# Patient Record
Sex: MALE | Race: WHITE | Employment: UNEMPLOYED | URBAN - METROPOLITAN AREA
[De-identification: names, ages, dates, MRNs, and addresses within clinical notes are randomized per-mention and may not be internally consistent; named-entity substitution may affect disease eponyms.]

---
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
## 2021-10-27 ENCOUNTER — HOSPITAL ENCOUNTER (EMERGENCY)
Age: 21
Discharge: HOME | End: 2021-10-28
Payer: COMMERCIAL

## 2021-10-27 ENCOUNTER — APPOINTMENT (OUTPATIENT)
Dept: GENERAL RADIOLOGY | Age: 21
End: 2021-10-27
Payer: COMMERCIAL

## 2021-10-27 VITALS
OXYGEN SATURATION: 98 % | DIASTOLIC BLOOD PRESSURE: 108 MMHG | SYSTOLIC BLOOD PRESSURE: 162 MMHG | RESPIRATION RATE: 18 BRPM | HEART RATE: 108 BPM | TEMPERATURE: 98.8 F

## 2021-10-27 DIAGNOSIS — M25.562 ACUTE PAIN OF LEFT KNEE: Primary | ICD-10-CM

## 2021-10-27 PROCEDURE — 73562 X-RAY EXAM OF KNEE 3: CPT

## 2021-10-27 PROCEDURE — 99282 EMERGENCY DEPT VISIT SF MDM: CPT

## 2021-10-27 ASSESSMENT — PAIN SCALES - GENERAL: PAINLEVEL_OUTOF10: 10

## 2021-10-27 ASSESSMENT — PAIN DESCRIPTION - PAIN TYPE: TYPE: ACUTE PAIN

## 2021-10-27 ASSESSMENT — PAIN DESCRIPTION - ORIENTATION: ORIENTATION: LEFT

## 2021-10-27 ASSESSMENT — PAIN DESCRIPTION - LOCATION: LOCATION: KNEE

## 2021-10-27 NOTE — Clinical Note
Chepe Lord was seen and treated in our emergency department on 10/27/2021. He may return to work on 10/30/2021. Follow-up with your primary care provider in 1 to 2 days return to the emergency room immediately if any symptoms worsen. Follow-up with orthopedics with number provided. If you have any questions or concerns, please don't hesitate to call.       Ru Chase PA-C

## 2021-10-28 NOTE — ED PROVIDER NOTES
Seen independently       [facility]  Department of Emergency Medicine   ED  Encounter Note  Admit Date/RoomTime: 10/27/2021 11:41 PM  ED Room: [facility]    NAME: Shana Huber  : 2000  MRN: [id number]     Chief Complaint:  Leg Pain (pt hurt left knee today while at work, unsure exactly how)    HISTORY OF PRESENT ILLNESS        Shana Huber is a 24 y.o. male who presents to the ED by private vehicle for left knee pain. Patient states that he was at work yesterday when he was walking down trailer and hyperextended his left knee. Patient states that he misstepped and it caused his knee to go backwards. Patient states that he has been able to walk on it since but it has caused him some pain. Patient denies any previous surgeries or fractures of the left knee. Patient denies any loss of sensation distally to the injury. Patient has not take anything at home for symptoms. Patient denies using any heat or ice to help with symptoms. Patient denies any other injuries from the incident. Patient denies any significant past medical history denies any medication allergies. ROS   Pertinent positives and negatives are stated within HPI, all other systems reviewed and are negative. Past Medical History:  has no past medical history on file. Surgical History:  has no past surgical history on file. Social History:  reports that he has never smoked. He has never used smokeless tobacco. He reports previous drug use. He reports that he does not drink alcohol. Family History: family history is not on file. Allergies: Patient has no known allergies. PHYSICAL EXAM   Oxygen Saturation Interpretation: Normal on room air analysis. ED Triage Vitals [10/27/21 2300]   BP Temp Temp src Pulse Resp SpO2 Height Weight   -- -- -- 112 -- 96 % -- --         Physical Exam  Constitutional/General: Alert and oriented x3, well appearing, non toxic. HEENT:  NC/NT. PERRLA,  Airway patent.   Neck: Supple, full ROM  Respiratory: Lungs clear to auscultation bilaterally  CV:  Regular rate. Regular rhythm. GI:  Abdomen Soft, Non tender, Non distended. +BS. Musculoskeletal: Moves all extremities x 4. Warm and well perfused, no clubbing, cyanosis, or edema. Patient is able to bear weight on the left knee. Patient has some tenderness across the anterior left knee joint. There is no obvious swelling. Patient is neurovascularly intact distal to the left knee injury. Patient seen he appears to be stable and does not have any laxity on examination. Integument: skin warm and dry. No rashes. Neurologic: GCS 15, no focal deficits. Intact sensation. Psychiatric: Normal Affect      Lab / Imaging Results   (All laboratory and radiology results have been personally reviewed by myself)  Labs:  No results found for this visit on 10/27/21. Imaging: All Radiology results interpreted by Radiologist unless otherwise noted. XR KNEE LEFT (3 VIEWS)   Final Result   Normal radiographic appearance of the left knee. ED Course / Medical Decision Making   Medications - No data to display      Consultations:             None    Procedures:   none    MDM:    00:55  Updated the patient on the results of his x-ray. His x-ray shows no acute abnormalities. Patient will be discharged at this time. He was instructed to follow-up with his primary care provider in 1 to 2 days return to the emergency room with any worsening symptoms. He was instructed to take Tylenol ibuprofen at home for his pain. Patient and mother in agreement this plan of care. Plan of Care/Counseling:  Jaydon Hale PA-C reviewed today's visit with the patient in addition to providing specific details for the plan of care and counseling regarding the diagnosis and prognosis. Questions are answered at this time and are agreeable with the plan. ASSESSMENT     1.  Acute pain of left knee      This patient's ED course included: a personal history and physicial examination  This patient has remained hemodynamically stable during their ED course. PLAN   Discharged home. Patient condition is stable. New Medications     New Prescriptions    No medications on file     Electronically signed by Parth Fitzgerald   DD: 10/27/21  **This report was transcribed using voice recognition software. Every effort was made to ensure accuracy; however, inadvertent computerized transcription errors may be present.   END OF PROVIDER NOTE     Jaydon Hale PA-C  10/28/21 2197

## 2021-11-01 ENCOUNTER — TELEPHONE (OUTPATIENT)
Dept: ADMINISTRATIVE | Age: 21
End: 2021-11-01

## 2021-11-01 DIAGNOSIS — M25.562 ACUTE PAIN OF LEFT KNEE: Primary | ICD-10-CM

## 2021-11-01 NOTE — TELEPHONE ENCOUNTER
If work related injury recommend follow with corporate care.   [facility]  [address] [place] [address]  P: [phone]  Hours of Operation  Monday - Friday 8:00 AM - 4:30 PM    If patient not pursuing [employer] work injury will place referral to sports medicine for knee pain  Electronically signed by Bobby Quesada PA-C on 11/1/2021 at 2:59 PM

## 2021-11-01 NOTE — TELEPHONE ENCOUNTER
Pt was seen in the [facility] ED on 10/27 for Acute pain of left knee,  And called to schedule his follow up with Dr Lio Gutierrez.

## 2021-11-01 NOTE — TELEPHONE ENCOUNTER
Chief Complaint:  Leg Pain (pt hurt left knee today while at work, unsure exactly how)      XR KNEE LEFT (3 VIEWS)   Final Result   Normal radiographic appearance of the left knee. Routed to Providers for consideration and guidance.
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
## 2021-11-02 NOTE — TELEPHONE ENCOUNTER
Order pended and routed for decision and signature for referral to sports medicine per Oneida Moody PA-C.

## 2021-11-03 NOTE — TELEPHONE ENCOUNTER
Call placed to pt's father, notified of no soon availability and referral placed to sports med. He verbalized understanding. Questions answered, provided phone number to Dr. Tami López office.

## 2021-11-05 ENCOUNTER — OFFICE VISIT (OUTPATIENT)
Dept: SPORTS MEDICINE | Age: 21
End: 2021-11-05
Payer: COMMERCIAL

## 2021-11-05 VITALS — HEIGHT: 65 IN | WEIGHT: 250 LBS | BODY MASS INDEX: 41.65 KG/M2

## 2021-11-05 DIAGNOSIS — S83.242A TEAR OF MEDIAL MENISCUS OF LEFT KNEE, CURRENT, UNSPECIFIED TEAR TYPE, INITIAL ENCOUNTER: ICD-10-CM

## 2021-11-05 DIAGNOSIS — M25.562 ACUTE PAIN OF LEFT KNEE: Primary | ICD-10-CM

## 2021-11-05 PROCEDURE — 1036F TOBACCO NON-USER: CPT | Performed by: FAMILY MEDICINE

## 2021-11-05 PROCEDURE — G8484 FLU IMMUNIZE NO ADMIN: HCPCS | Performed by: FAMILY MEDICINE

## 2021-11-05 PROCEDURE — G8417 CALC BMI ABV UP PARAM F/U: HCPCS | Performed by: FAMILY MEDICINE

## 2021-11-05 PROCEDURE — 99203 OFFICE O/P NEW LOW 30 MIN: CPT | Performed by: FAMILY MEDICINE

## 2021-11-05 PROCEDURE — G8427 DOCREV CUR MEDS BY ELIG CLIN: HCPCS | Performed by: FAMILY MEDICINE

## 2021-11-05 RX ORDER — DICLOFENAC SODIUM 75 MG/1
75 TABLET, DELAYED RELEASE ORAL 2 TIMES DAILY WITH MEALS
Qty: 60 TABLET | Refills: 0 | Status: SHIPPED
Start: 2021-11-05 | End: 2022-07-31

## 2021-11-05 RX ORDER — CETIRIZINE HYDROCHLORIDE 10 MG/1
10 TABLET ORAL DAILY
COMMUNITY

## 2021-11-05 NOTE — PROGRESS NOTES
[facility]  PRIMARY CARE SPORTS MEDICINE  DATE OF VISIT : 2021    Patient : Joann Hubbard  Age : 24 y.o.  : 2000  MRN : [id number]   ______________________________________________________________________    Chief Complaint :   Chief Complaint   Patient presents with    Knee Pain     (L) knee pain. Pain has been ongoing since 10/27. Pain is located at joint line of (L) knee. Patient presents in office today with swelling of the knee. JUAN JOSE Patient slipped on wet grass 10/27/2021. HPI : Joann Hubbard is 24 y.o. male who presented to the clinic today for evaluation of left knee pain. Onset of the symptoms was 10/27/2021 for a fall with a twisting mechanism. Current symptoms include giving out, pain located medially and swelling. Patient denies mechanical symptoms. Pain is aggravated by any weight bearing especially deep squats. Evaluation to date: Seen in the ER, XR obtained and negative for fracture or dislocation. Treatment to date: avoidance of offending activity and OTC analgesics which are not very effective. Past Medical History :  No past medical history on file. No past surgical history on file. Allergies :   No Known Allergies    Medication List :    Current Outpatient Medications   Medication Sig Dispense Refill    cetirizine (ZYRTEC) 10 MG tablet Take 10 mg by mouth daily      ibuprofen (IBU) 400 MG tablet Take 1 tablet by mouth every 6 hours as needed for Pain 120 tablet 0     No current facility-administered medications for this visit.   ______________________________________________________________________    Physical Exam :    Vitals: Ht 5' 5\" (1.651 m)   Wt 250 lb (113.4 kg) Comment: per pt. BMI 41.60 kg/m²   General Appearance: Nontoxic, awake, alert, and in no acute distress. Chest wall/Lung: Respirations regular and unlabored. No cyanosis. Heart: RRR, distal pulses intact. Neurologic: Alert&Oriented x3. Sensation grossly intact.  No focal motor deficits detected. Musculokeletal:   LEFT Knee:  ROM: flexion to 135, extension to 0.  1+ effusion. No obvious bony abnormality or ecchymosis. TTP: ( + ) MJL, ( - ) LJL, ( - ) patellar tendon, ( - ) quadriceps tendon, ( - ) patellar facets  Special Tests: ( - ) Patellar apprehension, ( - ) patellar grind  Stable and without pain to varus and valgus stress at 0 and 30 degrees of knee flexion. ACL: ( - ) Lachman's, ( - ) anterior drawer  PCL: ( - ) posterior drawer, ( - ) sag sign  Meniscus: ( + ) Papi's  ______________________________________________________________________    Assessment & Plan :    1. Acute pain of left knee  2. Tear of medial meniscus of left knee, current, unspecified tear type, initial encounter  Patient presents the office today for evaluation of left knee pain. History, physical exam and imaging are consistent with possible acute tear of his medial meniscus of the left knee. Treatment options discussed with patient in the office today including activity modification, oral anti-inflammatories, topical anti-inflammatories, injection options, advanced imaging in the form of a MRI and referral to orthopedic surgery for surgical opinion. Patient wishes to proceed with conservative management in the form of oral anti-inflammatories. A prescription for diclofenac was electronically prescribed to the pharmacy today, patient was encouraged to discontinue all other NSAIDs. Patient will follow up as needed. Patient is agreeable with the above plan and all questions and concerns were addressed in the office today. - diclofenac (VOLTAREN) 75 MG EC tablet; Take 1 tablet by mouth 2 times daily (with meals)  Dispense: 60 tablet; Refill: 0    Return to Office: Return if symptoms worsen or fail to improve.     Shilo Marin MD

## 2021-11-05 NOTE — PATIENT INSTRUCTIONS
Patient Education        Meniscus Tear: Exercises  Introduction  Here are some examples of exercises for you to try. The exercises may be suggested for a condition or for rehabilitation. Start each exercise slowly. Ease off the exercises if you start to have pain. You will be told when to start these exercises and which ones will work best for you. How to do the exercises  Calf wall stretch    1. Stand facing a wall with your hands on the wall at about eye level. Put your affected leg about a step behind your other leg. 2. Keeping your back leg straight and your back heel on the floor, bend your front knee and gently bring your hip and chest toward the wall until you feel a stretch in the calf of your back leg. 3. Hold the stretch for at least 15 to 30 seconds. 4. Repeat 2 to 4 times. 5. Repeat steps 1 through 4, but this time keep your back knee bent. Hamstring wall stretch    1. Lie on your back in a doorway, with your good leg through the open door. 2. Slide your affected leg up the wall to straighten your knee. You should feel a gentle stretch down the back of your leg. 3. Hold the stretch for at least 1 minute. Then over time, try to lengthen the time you hold the stretch to as long as 6 minutes. 4. Repeat 2 to 4 times. 5. If you do not have a place to do this exercise in a doorway, there is another way to do it:  6. Lie on your back, and bend your affected leg. 7. Loop a towel under the ball and toes of that foot, and hold the ends of the towel in your hands. 8. Straighten your knee, and slowly pull back on the towel. You should feel a gentle stretch down the back of your leg. 9. Hold the stretch for at least 15 to 30 seconds. Or even better, hold the stretch for 1 minute if you can. 10. Repeat 2 to 4 times. 1. Do not arch your back. 2. Do not bend either knee. 3. Keep one heel touching the floor and the other heel touching the wall. Do not point your toes. Quad sets    1.  Sit with your leg straight and supported on the floor or a firm bed. (If you feel discomfort in the front or back of your knee, place a small towel roll under your knee.)  2. Tighten the muscles on top of your thigh by pressing the back of your knee flat down to the floor. (If you feel discomfort under your kneecap, place a small towel roll under your knee.)  3. Hold for about 6 seconds, then rest up to 10 seconds. 4. Do 8 to 12 repetitions several times a day. Straight-leg raises to the front    1. Lie on your back with your good knee bent so that your foot rests flat on the floor. Your injured leg should be straight. Make sure that your low back has a normal curve. You should be able to slip your flat hand in between the floor and the small of your back, with your palm touching the floor and your back touching the back of your hand. 2. Tighten the thigh muscles in the injured leg by pressing the back of your knee flat down to the floor. Hold your knee straight. 3. Keeping the thigh muscles tight, lift your injured leg up so that your heel is about 12 inches off the floor. Hold for 5 seconds and then lower slowly. 4. Do 8 to 12 repetitions. Straight-leg raises to the back    1. Lie on your stomach, and lift your leg straight up behind you (toward the ceiling). 2. Lift your toes about 6 inches off the floor, hold for about 6 seconds, then lower slowly. 3. Do 8 to 12 repetitions. Hamstring curls    1. Lie on your stomach with your knees straight. If your kneecap is uncomfortable, roll up a washcloth and put it under your leg just above your kneecap. 2. Lift the foot of your injured leg by bending the knee so that you bring the foot up toward your buttock. If this motion hurts, try it without bending your knee quite as far. This may help you avoid any painful motion. 3. Slowly lower your leg back to the floor. 4. Do 8 to 12 repetitions.   5. With permission from your doctor or physical therapist, you may also want to add a cuff weight to your ankle (not more than 5 pounds). With weight, you do not have to lift your leg more than 12 inches to get a hamstring workout. Wall slide with ball squeeze    1. Stand with your back against a wall and with your feet about shoulder-width apart. Your feet should be about 12 inches away from the wall. 2. Put a ball about the size of a soccer ball between your knees. Then slowly slide down the wall until your knees are bent about 20 to 30 degrees. 3. Tighten your thigh muscles by squeezing the ball between your knees. Hold that position for about 10 seconds, then stop squeezing. Rest for up to 10 seconds between repetitions. 4. Repeat 8 to 12 times. Heel raises    1. Stand with your feet a few inches apart, with your hands lightly resting on a counter or chair in front of you. 2. Slowly raise your heels off the floor while keeping your knees straight. 3. Hold for about 6 seconds, then slowly lower your heels to the floor. 4. Do 8 to 12 repetitions several times during the day. Heel dig bridging    Stop doing this exercise if it causes pain. 1. Lie on your back with both knees bent and your ankles bent so that only your heels are digging into the floor. Your knees should be bent about 90 degrees. 2. Then push your heels into the floor, squeeze your buttocks, and lift your hips off the floor until your shoulders, hips, and knees are all in a straight line. 3. Hold for about 6 seconds as you continue to breathe normally, and then slowly lower your hips back down to the floor and rest for up to 10 seconds. 4. Do 8 to 12 repetitions. Shallow standing knee bends    Do this exercise only if you have very little pain; if you have no clicking, locking, or giving way in the injured knee; and if it does not hurt while you are doing 8 to 12 repetitions. 1. Stand with your hands lightly resting on a counter or chair in front of you. Put your feet shoulder-width apart.   2. Slowly bend your knees so that you squat down like you are going to sit in a chair. Make sure your knees do not go in front of your toes. 3. Lower yourself about 6 inches. Your heels should remain on the floor at all times. 4. Rise slowly to a standing position. Follow-up care is a key part of your treatment and safety. Be sure to make and go to all appointments, and call your doctor if you are having problems. It's also a good idea to know your test results and keep a list of the medicines you take. Where can you learn more? Go to https://ExaptivepeMoneyLioneb.Secure-NOK. org and sign in to your Ablo[employer] account. Enter C183 in the Echolocation box to learn more about \"Meniscus Tear: Exercises. \"     If you do not have an account, please click on the \"Sign Up Now\" link. Current as of: July 1, 2021               Content Version: 13.0  © 2006-2021 [employer]wise[employer]. Care instructions adapted under license by [facility] ([facility]). If you have questions about a medical condition or this instruction, always ask your healthcare professional. [place] [address] any warranty or liability for your use of this information.

## 2022-07-31 ENCOUNTER — HOSPITAL ENCOUNTER (EMERGENCY)
Age: 22
Discharge: HOME | End: 2022-07-31
Payer: COMMERCIAL

## 2022-07-31 ENCOUNTER — APPOINTMENT (OUTPATIENT)
Dept: CT IMAGING | Age: 22
End: 2022-07-31
Payer: COMMERCIAL

## 2022-07-31 VITALS
HEIGHT: 65 IN | RESPIRATION RATE: 14 BRPM | SYSTOLIC BLOOD PRESSURE: 141 MMHG | OXYGEN SATURATION: 96 % | WEIGHT: 300 LBS | BODY MASS INDEX: 49.98 KG/M2 | DIASTOLIC BLOOD PRESSURE: 82 MMHG | TEMPERATURE: 97.8 F | HEART RATE: 81 BPM

## 2022-07-31 DIAGNOSIS — S02.2XXA CLOSED FRACTURE OF NASAL BONE, INITIAL ENCOUNTER: Primary | ICD-10-CM

## 2022-07-31 PROCEDURE — 99284 EMERGENCY DEPT VISIT MOD MDM: CPT

## 2022-07-31 PROCEDURE — 70486 CT MAXILLOFACIAL W/O DYE: CPT
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
